# Patient Record
Sex: MALE | NOT HISPANIC OR LATINO | Employment: OTHER | URBAN - METROPOLITAN AREA
[De-identification: names, ages, dates, MRNs, and addresses within clinical notes are randomized per-mention and may not be internally consistent; named-entity substitution may affect disease eponyms.]

---

## 2020-07-14 ENCOUNTER — HOSPITAL ENCOUNTER (EMERGENCY)
Facility: HOSPITAL | Age: 40
Discharge: HOME | End: 2020-07-14
Attending: EMERGENCY MEDICINE | Admitting: EMERGENCY MEDICINE

## 2020-07-14 ENCOUNTER — APPOINTMENT (OUTPATIENT)
Dept: GENERAL RADIOLOGY | Facility: HOSPITAL | Age: 40
End: 2020-07-14

## 2020-07-14 VITALS
HEART RATE: 93 BPM | BODY MASS INDEX: 48.67 KG/M2 | SYSTOLIC BLOOD PRESSURE: 132 MMHG | DIASTOLIC BLOOD PRESSURE: 82 MMHG | WEIGHT: 257.8 LBS | OXYGEN SATURATION: 98 % | TEMPERATURE: 98.7 F | RESPIRATION RATE: 18 BRPM | HEIGHT: 61 IN

## 2020-07-14 DIAGNOSIS — Z20.822 SUSPECTED COVID-19 VIRUS INFECTION: Primary | ICD-10-CM

## 2020-07-14 DIAGNOSIS — J06.9 UPPER RESPIRATORY TRACT INFECTION, UNSPECIFIED TYPE: ICD-10-CM

## 2020-07-14 PROCEDURE — U0004 COV-19 TEST NON-CDC HGH THRU: HCPCS | Performed by: PHYSICIAN ASSISTANT

## 2020-07-14 PROCEDURE — 71045 X-RAY EXAM CHEST 1 VIEW: CPT

## 2020-07-14 PROCEDURE — 99283 EMERGENCY DEPT VISIT LOW MDM: CPT

## 2020-07-14 NOTE — ED PROVIDER NOTES
EMERGENCY DEPARTMENT ENCOUNTER    Room Number:  01/01  Date of encounter:  7/14/2020  PCP: Provider, No Known  Historian: Patient   service used.      I used full protective equipment while examining this patient.  This includes face mask, gloves and protective eyewear.  I washed my hands before entering the room and immediately upon leaving the room    HPI:  Chief Complaint: Fever, cough  A complete HPI/ROS/PMH/PSH/SH/FH are unobtainable due to: Nothing    Context: Les Blake is a 40 y.o. male who presents to the ED c/o gradual onset, intermittent 5-day history of cough, myalgias, headache, fever, chills.  Patient denies any shortness of breath, loss of smell/taste, nausea, abdominal pain, vomiting.  Patient states he has several coworkers who have tested positive.  He denies any family members with similar symptoms.  He denies any precipitating or alleviating factors.    Review of Medical Records  No previous medical records found.    PAST MEDICAL HISTORY  Active Ambulatory Problems     Diagnosis Date Noted   • No Active Ambulatory Problems     Resolved Ambulatory Problems     Diagnosis Date Noted   • No Resolved Ambulatory Problems     No Additional Past Medical History         PAST SURGICAL HISTORY  History reviewed. No pertinent surgical history.      FAMILY HISTORY  History reviewed. No pertinent family history.      SOCIAL HISTORY  Social History     Socioeconomic History   • Marital status:      Spouse name: Not on file   • Number of children: Not on file   • Years of education: Not on file   • Highest education level: Not on file   Tobacco Use   • Smoking status: Current Every Day Smoker   Substance and Sexual Activity   • Alcohol use: Yes     Comment: socially         ALLERGIES  Patient has no known allergies.        REVIEW OF SYSTEMS  All systems reviewed and negative except for those discussed in HPI.       PHYSICAL EXAM    I have reviewed the triage vital signs and  nursing notes.    ED Triage Vitals   Temp Heart Rate Resp BP SpO2   07/14/20 1710 07/14/20 1710 07/14/20 1710 07/14/20 1728 07/14/20 1710   98.8 °F (37.1 °C) 112 16 145/90 98 %      Temp src Heart Rate Source Patient Position BP Location FiO2 (%)   07/14/20 1710 07/14/20 1710 07/14/20 1728 07/14/20 1728 --   Tympanic Monitor Sitting Right arm        Physical Exam  GENERAL: Alert, oriented, not distressed  HENT: nares patent, head atraumatic, no nuchal rigidity  EYES: no scleral icterus, EOMI  CV: regular rhythm, regular rate, no murmur  RESPIRATORY: normal effort, CTA  ABDOMEN: soft, nontender  MUSCULOSKELETAL: no deformity, FROM  NEURO: alert, moves all extremities, follows commands  SKIN: warm, dry, no rash      RADIOLOGY  Xr Chest 1 View    Result Date: 7/14/2020  XR CHEST 1 VW-  HISTORY: Male who is 40 years-old,  fever, cough  TECHNIQUE: Frontal view of the chest  COMPARISON: None available  FINDINGS: Heart size is normal. Pulmonary vasculature appears congested. No focal pulmonary consolidation, pleural effusion, or pneumothorax. No acute osseous process.      No focal pulmonary consolidation.  Pulmonary vascular congestion. Follow-up as indications persist.  This report was finalized on 7/14/2020 6:49 PM by Dr. Melecio Rod M.D.        I ordered the above noted radiological studies. Reviewed by me and discussed with radiologist.  See dictation for official radiology interpretation.      PROGRESS, DATA ANALYSIS, CONSULTS, AND MEDICAL DECISION MAKING    All labs have been independently reviewed by me.  All radiology studies have been reviewed by me and discussed with radiologist dictating the report.   EKG's independently viewed and interpreted by me.  Discussion below represents my analysis of pertinent findings related to patient's condition, differential diagnosis, treatment plan and final disposition.    I have discussed case with Dr. Caicedo, emergency room physician.  He has performed his own  bedside examination and agrees with treatment plan.    ED Course as of Jul 14 1924   Tue Jul 14, 2020   1757 Patient with 5-day history of fever, chills, myalgias, cough.  Patient does have known CoVid exposure with coworkers.  Patient is nontoxic-appearing with stable vitals.  Plan for chest x-ray and outpatient COVID-19 testing.     [EE]   1840 Chest x-ray interpreted by myself shows no acute infiltrate.  I will await final radiologist interpretation.    [EE]   1900 Chest x-ray read as possible pulmonary congestion.  I have very low suspicion of this.  Patient has no pedal edema or history of heart failure.  I believe this is most likely related body habitus or portable technique.    [EE]   1908 Patient has stable vitals without evidence of hypoxia or pneumonia.  Patient has a benign exam without evidence of sepsis or meningitis.  I suspect this is likely COVID-19.  I have explained to him the results will be available tomorrow.  He understands to be under home quarantine in the meantime.    [EE]      ED Course User Index  [EE] Max Pearce PA       AS OF 19:24 VITALS:    BP - (!) 147/103  HR - 112  TEMP - 98.7 °F (37.1 °C) (Tympanic)  O2 SATS - 97%        DIAGNOSIS  Final diagnoses:   Suspected COVID-19 virus infection   Upper respiratory tract infection, unspecified type         DISPOSITION  Discharged           Max Pearce PA  07/14/20 1924

## 2020-07-14 NOTE — ED NOTES
Pt to this ER c/o fever, chills and body aches x4-5 days. Pt has had exposure to a covid+ pt Reports cough that began today, as well as some slight SOA.  Full PPE worn per guidelines, gown, mask, gloves, goggles and face shield. Appropriate hand hygeine completed prior to donning, appropriate hand hygiene completed after doffing. Pt wearing mask throughout encounter.       Tea Merritt, SUSAN  07/14/20 [id number]

## 2020-07-14 NOTE — ED PROVIDER NOTES
Pt presents to the ED c/o  5 days of cough, muscle aches, mild headache, fever and chills.  He denies shortness of breath, loss of smell.  He has been around coworkers who have tested positive for COVID.     On exam,   Awake and alert, no acute distress.  Normal work of breathing.     Plan: Chest x-ray reviewed.  O2 saturations 97% on room air.  Obtain outpatient COVID testing.  Patient instructed to self quarantine at home until testing results.  Return precautions were discussed, specifically for shortness of air.    I wore a surgical mask, gloves, eye protection for this patient encounter.  Patient also wearing a surgical mask.  Hand hygiene performed before and after seeing the patient.     Attestation:  The JESÚS and I have discussed this patient's history, physical exam, and treatment plan.  I have reviewed the documentation and personally had a face to face interaction with the patient. I affirm the documentation and agree with the treatment and plan.  The attached note describes my personal findings.        Arturo Caicedo MD  07/14/20 [id number]

## 2020-07-15 ENCOUNTER — EPISODE CHANGES (OUTPATIENT)
Dept: CASE MANAGEMENT | Facility: OTHER | Age: 40
End: 2020-07-15

## 2020-07-15 LAB
REF LAB TEST METHOD: ABNORMAL
SARS-COV-2 RNA RESP QL NAA+PROBE: DETECTED

## 2020-07-23 ENCOUNTER — EPISODE CHANGES (OUTPATIENT)
Dept: CASE MANAGEMENT | Facility: OTHER | Age: 40
End: 2020-07-23

## 2023-06-08 NOTE — ED TRIAGE NOTES
Feels like he has temp, body aches and dizziness x 5-6 days.  Was exposed to covid - friend.  Primary language Ma[place] - used interpretor ipad    Patient was placed in face mask during first look triage.  Patient was wearing a face mask throughout encounter.  I wore personal protective equipment throughout the encounter.  Hand hygiene was performed before and after patient encounter.     
fair balance